# Patient Record
(demographics unavailable — no encounter records)

---

## 2025-02-21 NOTE — RISK ASSESSMENT
[0] : 2) Feeling down, depressed, or hopeless: Not at all (0) [PHQ-2 Negative - No further assessment needed] : PHQ-2 Negative - No further assessment needed [[id number]] : 0

## 2025-02-21 NOTE — HISTORY OF PRESENT ILLNESS
[Mother] : mother [Toothpaste] : Primary Fluoride Source: Toothpaste [Needs Immunizations] : Needs immunizations [Normal] : normal [Days of Bleeding: _____] : Days of bleeding: [unfilled] [Irregular menses] : irregular menses [Acne] : acne [Eats meals with family] : eats meals with family [Has family members/adults to turn to for help] : has family members/adults to turn to for help [Is permitted and is able to make independent decisions] : Is permitted and is able to make independent decisions [Grade: ____] : Grade: [unfilled] [Normal Performance] : normal performance [Normal Behavior/Attention] : normal behavior/attention [Normal Homework] : normal homework [Eats regular meals including adequate fruits and vegetables] : eats regular meals including adequate fruits and vegetables [Calcium source] : calcium source [Has friends] : has friends [At least 1 hour of physical activity a day] : at least 1 hour of physical activity a day [Has interests/participates in community activities/volunteers] : has interests/participates in community activities/volunteers. [No] : No cigarette smoke exposure [Uses safety belts/safety equipment] : uses safety belts/safety equipment  [Has peer relationships free of violence] : has peer relationships free of violence [Yes] : Patient has had sexual intercourse. [Sometimes] : Condom use: sometimes [Other Method: ______] : Other Method: [unfilled] [Vaginal] : vaginal [Male ___] : # of current male partners: [unfilled]  [Has ways to cope with stress] : has ways to cope with stress [Displays self-confidence] : displays self-confidence [Heavy Bleeding] : no heavy bleeding [Painful Cramps] : no painful cramps [Tampon Use] : no tampon use [Sleep Concerns] : no sleep concerns [Drinks non-sweetened liquids] : does not drink non-sweetened liquids  [Has concerns about body or appearance] : does not have concerns about body or appearance [Screen time (except homework) less than 2 hours a day] : no screen time (except homework) less than 2 hours a day [Uses electronic nicotine delivery system] : does not use electronic nicotine delivery system [Exposure to electronic nicotine delivery system] : no exposure to electronic nicotine delivery system [Uses tobacco] : does not use tobacco [Exposure to tobacco] : no exposure to tobacco [Uses drugs] : does not use drugs  [Exposure to drugs] : no exposure to drugs [Drinks alcohol] : does not drink alcohol [Exposure to alcohol] : no exposure to alcohol [Impaired/distracted driving] : no impaired/distracted driving [History of Sexual Abuse] : no history of sexual abuse [History of STI] : no history of STI [History of Pregnancy] : no history of pregnancy [Has problems with sleep] : does not have problems with sleep [Gets depressed, anxious, or irritable/has mood swings] : does not get depressed, anxious, or irritable/has mood swings [Has thought about hurting self or considered suicide] : has not thought about hurting self or considered suicide [FreeTextEntry8] : Pt being getting her period every week since she started taking birth control pills, for like a year this being going on.

## 2025-02-21 NOTE — RISK ASSESSMENT
[0] : 2) Feeling down, depressed, or hopeless: Not at all (0) [PHQ-2 Negative - No further assessment needed] : PHQ-2 Negative - No further assessment needed [[id number]] : 0

## 2025-05-15 NOTE — HISTORY OF PRESENT ILLNESS
[EENT/Resp Symptoms] : EENT/RESPIRATORY SYMPTOMS [Runny nose] : runny nose [Nasal congestion] : nasal congestion [Wet cough] : wet cough [Runny Nose] : runny nose [Nasal Congestion] : nasal congestion [Cough] : cough [Recent swimming] : no recent swimming [Known Exposure to COVID-19] : no known exposure to COVID-19 [History of recent COVID-19 infection] : no history of recent COVID-19 infection [Sick Contacts: ___] : no sick contacts [Fever] : no fever [de-identified] : congestion and cough [FreeTextEntry6] : worsening of cough and also congestion decrease oral intake and states that she has been having head pressure  no fveer no distress

## 2025-05-15 NOTE — PHYSICAL EXAM
[Mucoid Discharge] : mucoid discharge [Hypertrophied Nasal Mucosa] : hypertrophied nasal mucosa [Inflamed Gingiva] : inflamed gingiva [Exudate] : exudate [NL] : warm, clear